# Patient Record
Sex: FEMALE | Race: WHITE | NOT HISPANIC OR LATINO | Employment: UNEMPLOYED | URBAN - NONMETROPOLITAN AREA
[De-identification: names, ages, dates, MRNs, and addresses within clinical notes are randomized per-mention and may not be internally consistent; named-entity substitution may affect disease eponyms.]

---

## 2022-07-04 ENCOUNTER — HOSPITAL ENCOUNTER (EMERGENCY)
Facility: HOSPITAL | Age: 87
Discharge: HOME | End: 2022-07-04
Attending: PHYSICIAN ASSISTANT | Admitting: PHYSICIAN ASSISTANT
Payer: MEDICARE

## 2022-07-04 VITALS
HEART RATE: 79 BPM | DIASTOLIC BLOOD PRESSURE: 69 MMHG | TEMPERATURE: 97.9 F | SYSTOLIC BLOOD PRESSURE: 138 MMHG | OXYGEN SATURATION: 95 % | RESPIRATION RATE: 18 BRPM

## 2022-07-04 DIAGNOSIS — R55 SYNCOPE: ICD-10-CM

## 2022-07-04 DIAGNOSIS — R79.89 ELEVATED TROPONIN: ICD-10-CM

## 2022-07-04 LAB
ANION GAP SERPL CALCULATED.3IONS-SCNC: 7 MMOL/L (ref 3–14)
BASOPHILS # BLD AUTO: 0.1 10E3/UL (ref 0–0.2)
BASOPHILS NFR BLD AUTO: 1 %
BUN SERPL-MCNC: 33 MG/DL (ref 7–30)
CALCIUM SERPL-MCNC: 8.6 MG/DL (ref 8.5–10.1)
CHLORIDE BLD-SCNC: 106 MMOL/L (ref 94–109)
CO2 SERPL-SCNC: 23 MMOL/L (ref 20–32)
CREAT SERPL-MCNC: 1.24 MG/DL (ref 0.52–1.04)
EOSINOPHIL # BLD AUTO: 0.1 10E3/UL (ref 0–0.7)
EOSINOPHIL NFR BLD AUTO: 1 %
ERYTHROCYTE [DISTWIDTH] IN BLOOD BY AUTOMATED COUNT: 15.4 % (ref 10–15)
GFR SERPL CREATININE-BSD FRML MDRD: 41 ML/MIN/1.73M2
GLUCOSE BLD-MCNC: 155 MG/DL (ref 70–99)
HCT VFR BLD AUTO: 32.8 % (ref 35–47)
HGB BLD-MCNC: 10.8 G/DL (ref 11.7–15.7)
HOLD SPECIMEN: NORMAL
IMM GRANULOCYTES # BLD: 0 10E3/UL
IMM GRANULOCYTES NFR BLD: 0 %
LYMPHOCYTES # BLD AUTO: 0.5 10E3/UL (ref 0.8–5.3)
LYMPHOCYTES NFR BLD AUTO: 7 %
MAGNESIUM SERPL-MCNC: 2.4 MG/DL (ref 1.6–2.3)
MCH RBC QN AUTO: 34.4 PG (ref 26.5–33)
MCHC RBC AUTO-ENTMCNC: 32.9 G/DL (ref 31.5–36.5)
MCV RBC AUTO: 105 FL (ref 78–100)
MONOCYTES # BLD AUTO: 1 10E3/UL (ref 0–1.3)
MONOCYTES NFR BLD AUTO: 14 %
NEUTROPHILS # BLD AUTO: 5.4 10E3/UL (ref 1.6–8.3)
NEUTROPHILS NFR BLD AUTO: 77 %
NRBC # BLD AUTO: 0 10E3/UL
NRBC BLD AUTO-RTO: 0 /100
PLATELET # BLD AUTO: 166 10E3/UL (ref 150–450)
POTASSIUM BLD-SCNC: 4.7 MMOL/L (ref 3.4–5.3)
RBC # BLD AUTO: 3.14 10E6/UL (ref 3.8–5.2)
SODIUM SERPL-SCNC: 136 MMOL/L (ref 133–144)
TROPONIN I SERPL HS-MCNC: 191 NG/L
TROPONIN I SERPL HS-MCNC: 205 NG/L
WBC # BLD AUTO: 7.1 10E3/UL (ref 4–11)

## 2022-07-04 PROCEDURE — 84484 ASSAY OF TROPONIN QUANT: CPT | Performed by: PHYSICIAN ASSISTANT

## 2022-07-04 PROCEDURE — 36415 COLL VENOUS BLD VENIPUNCTURE: CPT | Performed by: PHYSICIAN ASSISTANT

## 2022-07-04 PROCEDURE — 80048 BASIC METABOLIC PNL TOTAL CA: CPT | Performed by: PHYSICIAN ASSISTANT

## 2022-07-04 PROCEDURE — 85025 COMPLETE CBC W/AUTO DIFF WBC: CPT | Performed by: PHYSICIAN ASSISTANT

## 2022-07-04 PROCEDURE — 99284 EMERGENCY DEPT VISIT MOD MDM: CPT

## 2022-07-04 PROCEDURE — 93005 ELECTROCARDIOGRAM TRACING: CPT

## 2022-07-04 PROCEDURE — 93010 ELECTROCARDIOGRAM REPORT: CPT | Performed by: INTERNAL MEDICINE

## 2022-07-04 PROCEDURE — 83735 ASSAY OF MAGNESIUM: CPT | Performed by: PHYSICIAN ASSISTANT

## 2022-07-04 PROCEDURE — 99284 EMERGENCY DEPT VISIT MOD MDM: CPT | Performed by: PHYSICIAN ASSISTANT

## 2022-07-04 ASSESSMENT — ENCOUNTER SYMPTOMS
FATIGUE: 0
CONSTIPATION: 0
FEVER: 0
DIARRHEA: 0
SHORTNESS OF BREATH: 0
CHILLS: 0
ABDOMINAL PAIN: 0
RESPIRATORY NEGATIVE: 1

## 2022-07-05 NOTE — ED TRIAGE NOTES
Pt presents via EMS for a syncopal episode after eating dinner tonight. Pt did not fall and was sitting in her chair. Pt had similar episode a few weeks ago. Pt states she feels back to normal now.     Shekhar Martinez, MSN, RN on 7/4/2022 at 7:55 PM

## 2022-07-05 NOTE — ED PROVIDER NOTES
History     Chief Complaint   Patient presents with     Syncope     The history is provided by the patient.     Nancie Cooper is a 89 year old female who presents following a syncopal episode this afternoon.  She notes that she has fairly frequent syncopal episodes at home and follows with a cardiologist for atrial fibrillation, on coumadin.  She denies any shortness of breath or chest pain.  She is otherwise feeling well now.  She does note ongoing sacral pain following a fall and sacral fracture over a month ago.          Allergies:  Allergies   Allergen Reactions     Chromium Rash     Certain metals.     Penicillins Rash       Problem List:    There are no problems to display for this patient.       Past Medical History:    No past medical history on file.    Past Surgical History:    No past surgical history on file.    Family History:    No family history on file.    Social History:  Marital Status:          Medications:    No current outpatient medications on file.        Review of Systems   Constitutional: Negative for chills, fatigue and fever.   Respiratory: Negative.  Negative for shortness of breath.    Cardiovascular: Negative for chest pain.   Gastrointestinal: Negative for abdominal pain, constipation and diarrhea.   Neurological: Positive for syncope.       Physical Exam   BP: 137/70  Pulse: 80  Temp: 97.9  F (36.6  C)  Resp: 18  SpO2: 97 %      Physical Exam  Vitals and nursing note reviewed.   Constitutional:       General: She is not in acute distress.     Appearance: Normal appearance. She is not ill-appearing, toxic-appearing or diaphoretic.   HENT:      Head: Normocephalic.   Cardiovascular:      Rate and Rhythm: Normal rate.      Heart sounds: Murmur heard.   Pulmonary:      Effort: No respiratory distress.      Breath sounds: No stridor. No rhonchi.   Abdominal:      General: There is no distension.      Tenderness: There is no abdominal tenderness.   Skin:     General: Skin is warm.    Neurological:      General: No focal deficit present.      Mental Status: She is alert.   Psychiatric:         Mood and Affect: Mood normal.         ED Course          EKG Interpretation:      Interpreted by Joseph Lai PA-C  Time reviewed: 1950  Symptoms at time of EKG: none - syncopal prior   Rhythm: sinus rhythm with 1st degree AV block  Rate: normal  Axis: left  Ectopy: none  Conduction: normal  ST Segments/ T Waves: No ST-T wave changes  Q Waves: none  Comparison to prior: No old EKG available    Clinical Impression: normal EKG     Results for orders placed or performed during the hospital encounter of 07/04/22 (from the past 24 hour(s))   CBC with platelets differential    Narrative    The following orders were created for panel order CBC with platelets differential.  Procedure                               Abnormality         Status                     ---------                               -----------         ------                     CBC with platelets and d...[[id number]]  Abnormal            Final result                 Please view results for these tests on the individual orders.   Basic metabolic panel   Result Value Ref Range    Sodium 136 133 - 144 mmol/L    Potassium 4.7 3.4 - 5.3 mmol/L    Chloride 106 94 - 109 mmol/L    Carbon Dioxide (CO2) 23 20 - 32 mmol/L    Anion Gap 7 3 - 14 mmol/L    Urea Nitrogen 33 (H) 7 - 30 mg/dL    Creatinine 1.24 (H) 0.52 - 1.04 mg/dL    Calcium 8.6 8.5 - 10.1 mg/dL    Glucose 155 (H) 70 - 99 mg/dL    GFR Estimate 41 (L) >60 mL/min/1.73m2   Troponin I   Result Value Ref Range    Troponin I High Sensitivity 191 (HH) <54 ng/L   Magnesium   Result Value Ref Range    Magnesium 2.4 (H) 1.6 - 2.3 mg/dL   CBC with platelets and differential   Result Value Ref Range    WBC Count 7.1 4.0 - 11.0 10e3/uL    RBC Count 3.14 (L) 3.80 - 5.20 10e6/uL    Hemoglobin 10.8 (L) 11.7 - 15.7 g/dL    Hematocrit 32.8 (L) 35.0 - 47.0 %     (H) 78 - 100 fL    MCH 34.4 (H) 26.5 -  33.0 pg    MCHC 32.9 31.5 - 36.5 g/dL    RDW 15.4 (H) 10.0 - 15.0 %    Platelet Count 166 150 - 450 10e3/uL    % Neutrophils 77 %    % Lymphocytes 7 %    % Monocytes 14 %    % Eosinophils 1 %    % Basophils 1 %    % Immature Granulocytes 0 %    NRBCs per 100 WBC 0 <1 /100    Absolute Neutrophils 5.4 1.6 - 8.3 10e3/uL    Absolute Lymphocytes 0.5 (L) 0.8 - 5.3 10e3/uL    Absolute Monocytes 1.0 0.0 - 1.3 10e3/uL    Absolute Eosinophils 0.1 0.0 - 0.7 10e3/uL    Absolute Basophils 0.1 0.0 - 0.2 10e3/uL    Absolute Immature Granulocytes 0.0 <=0.4 10e3/uL    Absolute NRBCs 0.0 10e3/uL   Extra Tube    Narrative    The following orders were created for panel order Extra Tube.  Procedure                               Abnormality         Status                     ---------                               -----------         ------                     Extra Blue Top Tube[[id number]]                              Final result               Extra Red Top Tube[[id number]]                               Final result               Extra Green Top (Lithium...[[id number]]                      Final result                 Please view results for these tests on the individual orders.   Extra Blue Top Tube   Result Value Ref Range    Hold Specimen JIC    Extra Red Top Tube   Result Value Ref Range    Hold Specimen JIC    Extra Green Top (Lithium Heparin) ON ICE   Result Value Ref Range    Hold Specimen JIC    Troponin I   Result Value Ref Range    Troponin I High Sensitivity 205 (HH) <54 ng/L       Medications - No data to display    Assessments & Plan (with Medical Decision Making)     I have reviewed the nursing notes.    I have reviewed the findings, diagnosis, plan and need for follow up with the patient.    New Prescriptions    No medications on file     Final diagnoses:   Syncope   Elevated troponin   89 yo female, with hx of syncope, HTN, atrial fibrillation, on coumadin.  She presented because of a momentary syncopal episode while  sitting today, woke up and then felt well.  Vitals and labs normal in the ER aside from a considerably elevated troponin with negative EKG and no symptoms of cardiac ischemia.  Discussed in detail the options with the patient and her son.  She has voiced desire to remain DNR and to not pursue any kind of surgical or procedural interventions if she were to have an acute myocardial infarction.  Given this information, along with the lack of symptoms (and after discussing with Dr. Perrin), it seemed reasonable to discharge the patient to home with her son and have her follow up closely with her cardiologist closer to home.  The patient and son agree that this is the most reasonable course of action tonight.      She also notes ongoing right hip pain, however, just recently had a repeat CT scan showing no acute findings and she has not had any falls since that CT scan.  Would recommend resting that leg, applying ice and heat and continuing Tylenol.  Follow up with PCP for this as well.      7/4/2022   [facility]     Joseph Lai PA-C  07/04/22 [id number]

## 2022-07-05 NOTE — DISCHARGE INSTRUCTIONS
Troponin I High Sensitivity <54 ng/L 205 High Panic   191 High Panic  CM     Comment: This Troponin-I result was obtained using a Siemens Dimension Vista High Sensitivity Troponin-I assay (TNIH). Effective 11/23/21, nine labs/sites in the [facility] switched from a Siemens [place] Contemporary Troponin I assay (CTNI) to a Siemens [place] High-Sensitivity Troponin I assay (TNIH).     As discussed your troponin level is considerably high, but with a normal EKG and without any symptoms of cardiac issues.  Also, with your desire to remain DNR and not pursue any further surgical or procedural interventions for cardiac ischemia, we recommend discharge to home, but to return if you notice worsening symptoms or desire to have further interventions done.  Recommend following up with your PCP and Cardiologist as soon as you are able for any further interventions/evaluation.  Also recommend discussing the risks/benefits of continuing on Warfarin at this point, with frequent falls.

## 2022-07-05 NOTE — ED NOTES
DATE:  7/4/2022   TIME OF RECEIPT FROM LAB:  2107  LAB TEST:  Troponin  LAB VALUE:  191  RESULTS GIVEN WITH READ-BACK TO (PROVIDER):  [place]  TIME LAB VALUE REPORTED TO PROVIDER:   2107

## 2022-07-17 ENCOUNTER — HEALTH MAINTENANCE LETTER (OUTPATIENT)
Age: 87
End: 2022-07-17

## 2022-09-25 ENCOUNTER — HEALTH MAINTENANCE LETTER (OUTPATIENT)
Age: 87
End: 2022-09-25

## 2023-08-05 ENCOUNTER — HEALTH MAINTENANCE LETTER (OUTPATIENT)
Age: 88
End: 2023-08-05

## 2024-09-22 ENCOUNTER — HEALTH MAINTENANCE LETTER (OUTPATIENT)
Age: 89
End: 2024-09-22